# Patient Record
(demographics unavailable — no encounter records)

---

## 2025-05-07 NOTE — ASSESSMENT
[FreeTextEntry1] :   Impression/plan: 67-year-old gentleman with history of CVA with persistent/worsening OAB-wet requiring multiple pull-ups during the day and night.  Has become quite frustrating for the patient and his wife.  He has failed first-line therapies as well as second line therapies in the form of anticholinergic and beta 3 agonist. 1, Urine c/s.  2. UDS.  3.  We did review briefly third line therapies, we will pursue final decision once we have studies completed.

## 2025-05-07 NOTE — HISTORY OF PRESENT ILLNESS
[FreeTextEntry1] : 68 [facility] [place] [facility] [place] with c/o urinary frequency, urgency, and UUI. He was seen by a urologist a couple of months ago, treated with Gemtesa which worked for 3 weeks, then no longer efficacious. He has incontinence day and night. He wears 4 pull ups during the day, and 1 pull up at night but the bed is wet. He had tried what sounds like anticholinergics, but that was also not helpful. He denies any obstructive voiding symptoms. Denies bowel issues. Denies UTIs/STIs.   PVR - 49 ml.

## 2025-05-24 NOTE — HISTORY OF PRESENT ILLNESS
[FreeTextEntry1] : 68 yo gentleman [facility] CVA presents with c/o urinary frequency, urgency, and UUI. He was seen by a urologist a couple of months ago, treated with Gemtesa which worked for 3 weeks, then no longer efficacious. He has incontinence day and night. He wears 4 pull ups during the day, and 1 pull up at night but the bed is wet. He had tried what sounds like anticholinergics, but that was also not helpful. He denies any obstructive voiding symptoms. Denies bowel issues. Denies UTIs/STIs.  PVR - 49 ml.  Past Medical History History of diabetes mellitus (V12.29) (Z86.39) History of hyperlipidemia (V12.29) (Z86.39)     5/19/25  67-year-old gentleman with history of CVA with persistent/worsening OAB-wet requiring multiple pull-ups during the day and night. Has become quite frustrating for the patient and his wife. He has failed first-line therapies as well as second line therapies in the form of anticholinergic and beta 3 agonist. The patient is here today to have urodynamics performed, review results and discuss treatment options. Denies significant change in med/surg history since last office visit.   UDS -   Filling/Storage Phase: First sensation 22 mL. Involuntary contractions were present . Pt demomnstrated urge urinary incontinence. DLPP 171 cm/H20. Compliance: normal. EMG Activity: normal.    Voiding Phase: Qmax 5.8 mL/s , Pdet at Qmax 45.4 cm/H20, Qmax at Pdet 0.3 mL/s, Pavg 53.1 cm/H20, Qavg 2.5 mL/s, voiding time 24.4 mm:sec, voided volume 91 mL and post void residual 17 mL. EMG activity was synergic.   Additional Comments: Voiding phase was not volitional - pt was experiencing DO.   Urodynamic Interpretation :   Normal bladder sensation. Decreased bladder capacity. Patient experiencing detrusor instability. The patient has  urge incontinence. The uroflow rate is decreased. The uroflow pattern is staccato. The detrusor contractility is normal. The intravesical voiding pressure is increased. The patient has incomplete bladder emptying, a post void residual of 17 cc. The spincter activity  is normal synergic.   Additional Procedure Related Findings/Comments: Voiding phase was not volitional - pt was experiencing DO.  Plan: UDS reviewed, DO with UUI.  1. Options for management of the patient's overactive bladder and incontinence discussed at length. These included medical therapy, behavioral modification, bladder retraining, and surgical options. Surgical options reviewed included injection of botulinum toxin versus sacral nerve stimulation therapy. The patient has decided to move forward Botox injections. RBAPC of this procedure discussed at length. Risks associated with Botox injection discussed at length including the potential risk of urinary retention requiring urethral catheterization. The patient is aware of these risks and would like to move forward with the procedure.